# Patient Record
Sex: MALE | Employment: FULL TIME | URBAN - METROPOLITAN AREA
[De-identification: names, ages, dates, MRNs, and addresses within clinical notes are randomized per-mention and may not be internally consistent; named-entity substitution may affect disease eponyms.]

---

## 2018-08-02 ENCOUNTER — HOSPITAL ENCOUNTER (OUTPATIENT)
Dept: GENERAL RADIOLOGY | Age: 38
Discharge: HOME | End: 2018-08-02
Attending: INTERNAL MEDICINE
Payer: COMMERCIAL

## 2018-08-02 ENCOUNTER — LAB ENCOUNTER (OUTPATIENT)
Dept: LAB | Age: 38
End: 2018-08-02
Attending: INTERNAL MEDICINE
Payer: COMMERCIAL

## 2018-08-02 ENCOUNTER — OFFICE VISIT (OUTPATIENT)
Dept: RHEUMATOLOGY | Facility: CLINIC | Age: 38
End: 2018-08-02
Payer: COMMERCIAL

## 2018-08-02 VITALS
BODY MASS INDEX: 38.32 KG/M2 | WEIGHT: 230 LBS | DIASTOLIC BLOOD PRESSURE: 75 MMHG | SYSTOLIC BLOOD PRESSURE: 127 MMHG | HEART RATE: 86 BPM | HEIGHT: 65 IN

## 2018-08-02 DIAGNOSIS — M25.50 POLYARTHRALGIA: Primary | ICD-10-CM

## 2018-08-02 DIAGNOSIS — M54.50 LOW BACK PAIN WITHOUT SCIATICA, UNSPECIFIED BACK PAIN LATERALITY, UNSPECIFIED CHRONICITY: ICD-10-CM

## 2018-08-02 DIAGNOSIS — M25.50 POLYARTHRALGIA: ICD-10-CM

## 2018-08-02 LAB
ALBUMIN SERPL BCP-MCNC: 4.1 G/DL (ref 3.5–4.8)
ALBUMIN/GLOB SERPL: 1.5 {RATIO} (ref 1–2)
ALP SERPL-CCNC: 46 U/L (ref 32–100)
ALT SERPL-CCNC: 53 U/L (ref 17–63)
ANION GAP SERPL CALC-SCNC: 7 MMOL/L (ref 0–18)
AST SERPL-CCNC: 29 U/L (ref 15–41)
BILIRUB SERPL-MCNC: 0.6 MG/DL (ref 0.3–1.2)
BUN SERPL-MCNC: 15 MG/DL (ref 8–20)
BUN/CREAT SERPL: 10.6 (ref 10–20)
CALCIUM SERPL-MCNC: 9.3 MG/DL (ref 8.5–10.5)
CHLORIDE SERPL-SCNC: 103 MMOL/L (ref 95–110)
CK SERPL-CCNC: 116 U/L (ref 49–397)
CO2 SERPL-SCNC: 27 MMOL/L (ref 22–32)
CREAT SERPL-MCNC: 1.42 MG/DL (ref 0.5–1.5)
CRP SERPL-MCNC: 1.6 MG/DL (ref 0–0.9)
ERYTHROCYTE [DISTWIDTH] IN BLOOD BY AUTOMATED COUNT: 13.2 % (ref 11–15)
ERYTHROCYTE [SEDIMENTATION RATE] IN BLOOD: 5 MM/HR (ref 0–15)
GLOBULIN PLAS-MCNC: 2.8 G/DL (ref 2.5–3.7)
GLUCOSE SERPL-MCNC: 98 MG/DL (ref 70–99)
HCT VFR BLD AUTO: 42.8 % (ref 41–52)
HGB BLD-MCNC: 14.9 G/DL (ref 13.5–17.5)
MCH RBC QN AUTO: 30.7 PG (ref 27–32)
MCHC RBC AUTO-ENTMCNC: 34.9 G/DL (ref 32–37)
MCV RBC AUTO: 88.1 FL (ref 80–100)
OSMOLALITY UR CALC.SUM OF ELEC: 285 MOSM/KG (ref 275–295)
PATIENT FASTING: YES
PLATELET # BLD AUTO: 274 K/UL (ref 140–400)
PMV BLD AUTO: 7.8 FL (ref 7.4–10.3)
POTASSIUM SERPL-SCNC: 3.8 MMOL/L (ref 3.3–5.1)
PROT SERPL-MCNC: 6.9 G/DL (ref 5.9–8.4)
RBC # BLD AUTO: 4.86 M/UL (ref 4.5–5.9)
RHEUMATOID FACT SER QL: <5 IU/ML
SODIUM SERPL-SCNC: 137 MMOL/L (ref 136–144)
URATE SERPL-MCNC: 8 MG/DL (ref 3.3–8.7)
WBC # BLD AUTO: 7 K/UL (ref 4–11)

## 2018-08-02 PROCEDURE — 85652 RBC SED RATE AUTOMATED: CPT

## 2018-08-02 PROCEDURE — 99204 OFFICE O/P NEW MOD 45 MIN: CPT | Performed by: INTERNAL MEDICINE

## 2018-08-02 PROCEDURE — 72202 X-RAY EXAM SI JOINTS 3/> VWS: CPT | Performed by: INTERNAL MEDICINE

## 2018-08-02 PROCEDURE — 84550 ASSAY OF BLOOD/URIC ACID: CPT

## 2018-08-02 PROCEDURE — 86803 HEPATITIS C AB TEST: CPT

## 2018-08-02 PROCEDURE — 86140 C-REACTIVE PROTEIN: CPT

## 2018-08-02 PROCEDURE — 85027 COMPLETE CBC AUTOMATED: CPT

## 2018-08-02 PROCEDURE — 86039 ANTINUCLEAR ANTIBODIES (ANA): CPT

## 2018-08-02 PROCEDURE — 80053 COMPREHEN METABOLIC PANEL: CPT

## 2018-08-02 PROCEDURE — 73130 X-RAY EXAM OF HAND: CPT | Performed by: INTERNAL MEDICINE

## 2018-08-02 PROCEDURE — 86038 ANTINUCLEAR ANTIBODIES: CPT

## 2018-08-02 PROCEDURE — 86200 CCP ANTIBODY: CPT

## 2018-08-02 PROCEDURE — 99212 OFFICE O/P EST SF 10 MIN: CPT | Performed by: INTERNAL MEDICINE

## 2018-08-02 PROCEDURE — 82550 ASSAY OF CK (CPK): CPT

## 2018-08-02 PROCEDURE — 36415 COLL VENOUS BLD VENIPUNCTURE: CPT

## 2018-08-02 PROCEDURE — 86431 RHEUMATOID FACTOR QUANT: CPT

## 2018-08-02 RX ORDER — MELOXICAM 15 MG/1
15 TABLET ORAL DAILY
Qty: 30 TABLET | Refills: 1 | Status: SHIPPED | OUTPATIENT
Start: 2018-08-02 | End: 2020-07-01

## 2018-08-02 RX ORDER — NAPROXEN SODIUM 220 MG
TABLET ORAL AS NEEDED
COMMUNITY
End: 2020-07-01

## 2018-08-02 NOTE — PROGRESS NOTES
Sha Augustine is a 42year old male who presents for Patient presents with:  Joint Pain: muscle soreness, sharp pain at times  Finger Pain  . HPI:     I had the pleasure of seeing Sha Augustine on 8/2/2018 for evaluation.      He is a pleasant 42 foot fused bones   Family History   Problem Relation Age of Onset   • Diabetes Father    • Other [OTHER] Brother      gout   mom  - has some hand IOA   2 brothers and 1 sister   Social History:  Smoking status: Current Some Day Smoker thumb base   Not tender in mcps - no synvotis - can close both hands easily - but feels tightness   Not tender in mtps   Tinel's negative   Knees not tender   No ankle tenenress    EXTREMITIES: no cyanosis, clubbing or edema  NEURO: intact touch, 5/5 ue an

## 2018-08-02 NOTE — PATIENT INSTRUCTIONS
1. Check labs  2. Check xrays   3. Try meloxicam 15mg a day   4. Cont. Elbow brace   5. Return to clinic in 2-3 weeks.

## 2018-08-03 LAB
CCP IGG SERPL-ACNC: 0.8 U/ML (ref 0–6.9)
HCV AB SERPL QL IA: NONREACTIVE

## 2018-08-06 LAB — NUCLEAR IGG TITR SER IF: POSITIVE {TITER}

## 2018-08-07 LAB — ANA NUCLEOLAR TITR SER IF: 80 {TITER}

## 2018-09-17 ENCOUNTER — OFFICE VISIT (OUTPATIENT)
Dept: RHEUMATOLOGY | Facility: CLINIC | Age: 38
End: 2018-09-17
Payer: COMMERCIAL

## 2018-09-17 DIAGNOSIS — M25.50 POLYARTHRALGIA: Primary | ICD-10-CM

## 2018-09-17 DIAGNOSIS — M54.50 LOW BACK PAIN WITHOUT SCIATICA, UNSPECIFIED BACK PAIN LATERALITY, UNSPECIFIED CHRONICITY: ICD-10-CM

## 2018-09-17 PROCEDURE — 99214 OFFICE O/P EST MOD 30 MIN: CPT | Performed by: INTERNAL MEDICINE

## 2018-09-17 PROCEDURE — 99212 OFFICE O/P EST SF 10 MIN: CPT | Performed by: INTERNAL MEDICINE

## 2018-09-17 RX ORDER — GABAPENTIN 300 MG/1
300 CAPSULE ORAL NIGHTLY
Qty: 30 CAPSULE | Refills: 1 | Status: SHIPPED | OUTPATIENT
Start: 2018-09-17 | End: 2020-07-01

## 2018-09-18 NOTE — PATIENT INSTRUCTIONS
1. Check labs on next time   2. Try gabapentin 300mg at  Night. 3.  Cont. Elbow brace   4. Aleve -   5. Return to clinic in 3 months.

## 2018-09-18 NOTE — PROGRESS NOTES
Magdy Uriarte is a 34year old male who presents for No chief complaint on file. Deysi Soliz HPI:     I had the pleasure of seeing Magdy Uriarte on 8/2/2018 for evaluation. He was referred by Dr. Kelsea Kimbrough.      He is a pleasant 42year old who has joint p OR Take by mouth daily. Disp:  Rfl:    GLUCOSAMINE-CHONDROITIN OR Take by mouth daily. Disp:  Rfl:    Meloxicam 15 MG Oral Tab Take 1 tablet (15 mg total) by mouth daily. Disp: 30 tablet Rfl: 1      No past medical history on file.    Past Surgical History: felt, BS positive  Joint exam:   heberdon nodes in hands 1-5th dips - no in b/l 3rd dips, right more than left   Tender in right elbow - lateral epicondltiis   Tender in left elbow = less than right - lateral epicondyltis.    Mild tender in right thumb bas

## 2018-10-21 ENCOUNTER — HOSPITAL (OUTPATIENT)
Dept: OTHER | Age: 38
End: 2018-10-21

## 2020-01-20 ENCOUNTER — HOSPITAL (OUTPATIENT)
Dept: OTHER | Age: 40
End: 2020-01-20
Attending: PHYSICIAN ASSISTANT

## 2020-07-01 PROBLEM — I10 ESSENTIAL HYPERTENSION: Status: ACTIVE | Noted: 2020-07-01

## 2020-07-01 PROBLEM — F32.A DEPRESSION, UNSPECIFIED DEPRESSION TYPE: Status: ACTIVE | Noted: 2020-07-01

## 2020-07-01 PROBLEM — E78.5 DYSLIPIDEMIA: Status: ACTIVE | Noted: 2020-07-01

## 2020-07-17 PROBLEM — R79.89 ELEVATED LFTS: Status: ACTIVE | Noted: 2020-07-17

## 2020-07-21 PROBLEM — M10.9 ACUTE GOUT OF RIGHT FOOT: Status: ACTIVE | Noted: 2020-07-21
